# Patient Record
Sex: FEMALE | Race: AMERICAN INDIAN OR ALASKA NATIVE | NOT HISPANIC OR LATINO | Employment: UNEMPLOYED | URBAN - METROPOLITAN AREA
[De-identification: names, ages, dates, MRNs, and addresses within clinical notes are randomized per-mention and may not be internally consistent; named-entity substitution may affect disease eponyms.]

---

## 2017-10-21 ENCOUNTER — COMMUNICATION - HEALTHEAST (OUTPATIENT)
Dept: FAMILY MEDICINE | Facility: CLINIC | Age: 57
End: 2017-10-21

## 2017-10-21 DIAGNOSIS — J18.9 PNEUMONIA DUE TO INFECTIOUS ORGANISM, UNSPECIFIED LATERALITY, UNSPECIFIED PART OF LUNG: ICD-10-CM

## 2021-06-16 PROBLEM — J18.9 PNEUMONIA: Status: ACTIVE | Noted: 2017-10-18

## 2021-11-12 ENCOUNTER — HOSPITAL ENCOUNTER (EMERGENCY)
Facility: HOSPITAL | Age: 61
Discharge: HOME | End: 2021-11-12
Attending: EMERGENCY MEDICINE | Admitting: EMERGENCY MEDICINE

## 2021-11-12 VITALS
WEIGHT: 135 LBS | DIASTOLIC BLOOD PRESSURE: 96 MMHG | RESPIRATION RATE: 16 BRPM | BODY MASS INDEX: 24.84 KG/M2 | SYSTOLIC BLOOD PRESSURE: 184 MMHG | HEIGHT: 62 IN | HEART RATE: 98 BPM | TEMPERATURE: 97.7 F | OXYGEN SATURATION: 99 %

## 2021-11-12 DIAGNOSIS — K08.89 PAIN, DENTAL: ICD-10-CM

## 2021-11-12 PROCEDURE — 250N000013 HC RX MED GY IP 250 OP 250 PS 637: Performed by: EMERGENCY MEDICINE

## 2021-11-12 PROCEDURE — 99283 EMERGENCY DEPT VISIT LOW MDM: CPT

## 2021-11-12 RX ORDER — PENICILLIN V POTASSIUM 250 MG/1
500 TABLET, FILM COATED ORAL ONCE
Status: COMPLETED | OUTPATIENT
Start: 2021-11-12 | End: 2021-11-12

## 2021-11-12 RX ORDER — PENICILLIN V POTASSIUM 500 MG/1
500 TABLET, FILM COATED ORAL 3 TIMES DAILY
Qty: 21 TABLET | Refills: 0 | Status: SHIPPED | OUTPATIENT
Start: 2021-11-12 | End: 2021-11-19

## 2021-11-12 RX ADMIN — PENICILLIN V POTASSIUM 500 MG: 250 TABLET, FILM COATED ORAL at 04:04

## 2021-11-12 ASSESSMENT — ENCOUNTER SYMPTOMS
SHORTNESS OF BREATH: 0
CHILLS: 0
FEVER: 0
TROUBLE SWALLOWING: 0
FACIAL SWELLING: 0

## 2021-11-12 ASSESSMENT — MIFFLIN-ST. JEOR: SCORE: 1135.61

## 2021-11-12 NOTE — ED PROVIDER NOTES
EMERGENCY DEPARTMENT ENCOUNTER     NAME: Wendy Monet   AGE: 61 year old female   YOB: 1960   MRN: [id number]   EVALUATION DATE & TIME: 11/12/2021  3:39 AM   PCP: No Ref-Primary, Physician     Chief Complaint   Patient presents with     Dental Problem   :    FINAL IMPRESSION       1. Pain, dental           ED COURSE & MEDICAL DECISION MAKING      Pertinent Labs & Imaging studies reviewed. (See chart for details)   61 year old female  presents to the Emergency Department for evaluation of right lower dental pain. Initial Vitals Reviewed. Initial exam notable for early well-appearing patient who has multiple missing, decayed, and partially missing teeth.  In the right lower gumline, near the area that I am estimating is tooth #29, she has some tenderness to percussion, a partially missing decayed tooth, and some gingival erythema.  There is no visible drainable abscess.  No signs of Josh's angina.  I suspect that she has developed a dental infection and could have an underlying abscess that we cannot see or drain.  Going to start penicillin for antibiotic coverage here tonight, discharge with ongoing prescription and have her follow-up closely with dentistry.            3:42AM I met with the patient for the initial interview and physical examination. Discussed plan for treatment and workup in the ED. PPE: Provider wore gloves, goggles, N95 mask, and surgical mask.   3:47 AM I discussed the plan for discharge with the patient, and patient is agreeable. We discussed supportive cares at home and reasons for return to the ER including new or worsening symptoms - all questions and concerns addressed. Patient to be discharged by RN.     At the conclusion of the encounter I discussed the results of all of the tests and the disposition. The questions were answered. The patient or family acknowledged understanding and was agreeable with the care plan.         MEDICATIONS GIVEN IN THE EMERGENCY:    Medications   penicillin V (VEETID) tablet 500 mg (has no administration in time range)      NEW PRESCRIPTIONS STARTED AT TODAY'S ER VISIT   New Prescriptions    PENICILLIN V (VEETID) 500 MG TABLET    Take 1 tablet (500 mg) by mouth 3 times daily for 7 days     ================================================================   HISTORY OF PRESENT ILLNESS       Patient information was obtained from: patient   Use of Intrepreter: N/A   Wendy Monet is a 61 year old female with a reported history of dental abscess who presents to this ED as a walk in with family for evaluation of dental problem.     Patient reports right lower dental pain for the past couple of days and believes that she has an abscess, but is aware that she needs to have the tooth removed. She has had dental abscesses in the past with other teeth which feel vaguely similar. Notes allergy to erythromycin. Patient does not have any other associated complaints or concerns at this time.      ================================================================    REVIEW OF SYSTEMS       Review of Systems   Constitutional: Negative for chills and fever.   HENT: Positive for dental problem. Negative for facial swelling and trouble swallowing.    Respiratory: Negative for shortness of breath.    All other systems reviewed and are negative.        PAST HISTORY     PAST MEDICAL HISTORY:   History reviewed. No pertinent past medical history.   PAST SURGICAL HISTORY:   History reviewed. No pertinent surgical history.   CURRENT MEDICATIONS:   penicillin V (VEETID) 500 MG tablet  acetaminophen (TYLENOL) 500 MG tablet  albuterol (PROAIR HFA;PROVENTIL HFA;VENTOLIN HFA) 90 mcg/actuation inhaler  benzonatate (TESSALON) 100 MG capsule  codeine-guaiFENesin (GUAIFENESIN AC)  mg/5 mL liquid  ibuprofen (ADVIL,MOTRIN) 200 MG tablet  melatonin 3 mg Tab tablet      ALLERGIES:   Allergies   Allergen Reactions     Erythromycin Other (See Comments)     SHE HALLUCINATES,  "hallucinations      FAMILY HISTORY:   History reviewed. No pertinent family history.   SOCIAL HISTORY:   Social History     Socioeconomic History     Marital status:      Spouse name: Not on file     Number of children: Not on file     Years of education: Not on file     Highest education level: Not on file   Occupational History     Not on file   Tobacco Use     Smoking status: Current Every Day Smoker     Packs/day: 0.50     Years: 30.00     Pack years: 15.00     Smokeless tobacco: Never Used   Substance and Sexual Activity     Alcohol use: No     Drug use: No     Sexual activity: Not on file   Other Topics Concern     Not on file   Social History Narrative     Not on file     Social Determinants of Health     Financial Resource Strain: Not on file   Food Insecurity: Not on file   Transportation Needs: Not on file   Physical Activity: Not on file   Stress: Not on file   Social Connections: Not on file   Intimate Partner Violence: Not on file   Housing Stability: Not on file        VITALS  Patient Vitals for the past 24 hrs:   BP Temp Temp src Pulse Resp SpO2 Height Weight   11/12/21 0259 (!) 184/96 97.7  F (36.5  C) Temporal 98 16 99 % 1.575 m (5' 2\") 61.2 kg (135 lb)        ================================================================    PHYSICAL EXAM     VITAL SIGNS: BP (!) 184/96   Pulse 98   Temp 97.7  F (36.5  C) (Temporal)   Resp 16   Ht 1.575 m (5' 2\")   Wt 61.2 kg (135 lb)   SpO2 99%   BMI 24.69 kg/m     Constitutional:  Awake, no acute distress   HENT:  Atraumatic, oropharynx without exudate or erythema, membranes moist. No trismus, no submandibular tenderness. Multiple missing teeth with poor dentition. Gums around the area near tooth 29 are erythematous and tender but without obvious abscess.   Lymph:  No adenopathy  Eyes: EOM intact, PERRL, no injection  Neck: Supple  Respiratory:  Clear to auscultation bilaterally, no wheezes or crackles   Cardiovascular:  Regular rate and rhythm, " single S1 and S2   GI:  Soft, nontender, nondistended, no rebound or guarding   Musculoskeletal:  Moves all extremities, no lower extremity edema, no deformities    Skin:  Warm, dry  Neurologic:  Alert and oriented x3, no focal deficits noted       ================================================================  LAB       All pertinent labs reviewed and interpreted.   Labs Ordered and Resulted from Time of ED Arrival to Time of ED Departure - No data to display     ===============================================================  RADIOLOGY       Reviewed all pertinent imaging. Please see official radiology report.   No orders to display         ================================================================  EKG         I have independently reviewed and interpreted the EKG(s) documented above.     ================================================================  PROCEDURES         I, Ashley Beebe, am serving as a scribe to document services personally performed by Dr. Lemon based on my observation and the provider's statements to me. I, Ladonna Lemon MD attest that Ashley Beebe is acting in a scribe capacity, has observed my performance of the services and has documented them in accordance with my direction.   Ladonna Lemon M.D.   Emergency Medicine   [facility] EMERGENCY DEPARTMENT  [address] [place] [id number] [phone]  Dept: [phone]      Ladonna Lemon MD  11/12/21 6000

## 2021-11-12 NOTE — DISCHARGE INSTRUCTIONS
As we discussed, when the gums turn red and the tooth starts to hurt like this, it typically indicative of an infection.  There are no signs of a drainable abscess that we could drain in the ER, but I recommend that you see a dentist right away and likely have the tooth extracted.  In the meantime, the first dose of antibiotics was started tonight and then once morning comes, fill the prescription and continue taking the antibiotic.  Tylenol or ibuprofen are both safe for discomfort.

## 2021-11-12 NOTE — ED TRIAGE NOTES
Pt reports developing tooth abscess on lower right side of mouth.  Pt has hx of tooth abscesses and usually ends up needing them lanced and getting antibiotics. Pt reports knowing she needs to get it pulled.    Pt denies fever.     Denies medical hx.